# Patient Record
Sex: MALE | Race: WHITE | URBAN - METROPOLITAN AREA
[De-identification: names, ages, dates, MRNs, and addresses within clinical notes are randomized per-mention and may not be internally consistent; named-entity substitution may affect disease eponyms.]

---

## 2018-12-04 ENCOUNTER — TRANSFERRED RECORDS (OUTPATIENT)
Dept: PHYSICAL THERAPY | Facility: CLINIC | Age: 33
End: 2018-12-04

## 2018-12-17 ENCOUNTER — THERAPY VISIT (OUTPATIENT)
Dept: PHYSICAL THERAPY | Facility: CLINIC | Age: 33
End: 2018-12-17
Payer: OTHER MISCELLANEOUS

## 2018-12-17 DIAGNOSIS — M54.50 ACUTE RIGHT-SIDED LOW BACK PAIN WITHOUT SCIATICA: ICD-10-CM

## 2018-12-17 PROCEDURE — 97161 PT EVAL LOW COMPLEX 20 MIN: CPT | Mod: GP | Performed by: PHYSICAL THERAPIST

## 2018-12-17 PROCEDURE — 97530 THERAPEUTIC ACTIVITIES: CPT | Mod: GP | Performed by: PHYSICAL THERAPIST

## 2018-12-17 PROCEDURE — 97110 THERAPEUTIC EXERCISES: CPT | Mod: GP | Performed by: PHYSICAL THERAPIST

## 2018-12-17 NOTE — PROGRESS NOTES
HPI                     System    Physical Exam                                         Musculoskeletal:        Arms:      ROS

## 2018-12-17 NOTE — PROGRESS NOTES
[facility] Initial Evaluation -- Lumbar    Date: December 17, 2018  Osmel Mccloud is a 33 year old male with a lumbar condition.   Referral: primary care  Work mechanical stresses:  Lifting, operating a machine   Employment status:  Working with restrictions for [employer]  Leisure mechanical stresses: golfing  Functional disability score (NEIL/STarT Back):  See NEIL in flowsheet  VAS score (0-10): 4/10  Patient goals/expectations:  derease pain    HISTORY:    Present symptoms: R to mid low back pain, stiffness  Pain quality (sharp/shooting/stabbing/aching/burning/cramping):  achey   Paresthesia (yes/no):  no    Present since (onset date): 11/21/18.     Symptoms (improving/unchanging/worsening):  unchanging.     Symptoms commenced as a result of: turning while on a ladder at work (was carrying a wreath)   Condition occurred in the following environment:   work     Symptoms at onset (back/thigh/leg): whole low back  Constant symptoms (back/thigh/leg): none  Intermittent symptoms (back/thigh/leg): back pain    Symptoms are made worse with the following: Always Sitting, Time of day - Always PM and Sometimes On the move   Symptoms are made better with the following: Other - foam roller, muscle relaxants    Disturbed sleep (yes/no):  Not much with meds Sleeping postures (prone/sup/side R/L): sides    Previous episodes (0/1-5/6-10/11+): hematoma from football contact in highs school Year of first episode: (high school)    Previous history: none  Previous treatments: none      Specific Questions:  Cough/Sneeze/Strain (pos/neg): neg  Bowel/Bladder (normal/abnormal): normal  Gait (normal/abnormal): normal  Medications (nil/NSAIDS/analg/steroids/anticoag/other):  Muscle relaxants  Medical allergies:  augmentin  General health (excellent/good/fair/poor):  good  Pertinent medical history:  None  Imaging (None/Xray/MRI/Other):  none  Recent or major surgery (yes/no):  none  Night pain  (yes/no): no  Accidents (yes/no): no  Unexplained weight loss (yes/no): no  Barriers at home: none  Other red flags: none    EXAMINATION    Posture:   Sitting (good/fair/poor): poor  Standing (good/fair/poor):fair  Lordosis (red/acc/normal): red  Correction of posture (better/worse/no effect): pain central spine    Lateral Shift (right/left/nil): nil  Relevant (yes/no):  n/a  Other Observations: none    Neurological:    Motor deficit:  none  Reflexes:  n/a  Sensory deficit:  none  Dural signs:  n/a    Movement Loss:   Tone Mod Min Nil Pain   Flexion    x PDM   Extension  x   Central low back pain   Side Gliding R    x    Side Gliding L    x      Test Movements:   During: produces, abolishes, increases, decreases, no effect, centralizing, peripheralizing   After: better, worse, no better, no worse, no effect, centralized, peripheralized    Pretest symptoms standing:    Symptoms During Symptoms After ROM increased ROM decreased No Effect   FIS        Rep FIS        EIS        Rep EIS        Pretest symptoms lying:     Symptoms During Symptoms After ROM increased ROM decreased No Effect   TEO        Rep TEO        EIL Produces No Worse      Rep EIL Decreases Better x     If required, pretest symptoms:    Symptoms During Symptoms After ROM increased ROM decreased No Effect   SGIS - R        Rep SGIS - R        SGIS - L        Rep SGIS - L          Static Tests:  Sitting slouched:    Sitting erect:    Standing slouched   Standing erect:    Lying prone in extension:   Long sitting:      Other Tests:     Provisional Classification:  Derangement - Asymmetrical, unilateral, symptoms above knee    Principle of Management:  Education:  Posture, temporary avoidance of flexion   Equipment provided:    Mechanical therapy (Y/N):  Y   Extension principle:  EIL with self overpressure 10 reps every 2-3 hrs  Lateral Principle:    Flexion principle:    Other:      ASSESSMENT/PLAN:    Patient is a 33 year old male with lumbar complaints.     Patient has the following significant findings with corresponding treatment plan.                Diagnosis 1:  R lumbar above knee derangement  Pain -  manual therapy, self management, education, directional preference exercise and home program  Decreased ROM/flexibility - manual therapy, therapeutic exercise, therapeutic activity and home program  Inflammation - self management/home program  Impaired muscle performance - neuro re-education and home program  Decreased function - therapeutic activities and home program  Impaired posture - neuro re-education, therapeutic activities and home program    Therapy Evaluation Codes:   1) History comprised of:   Personal factors that impact the plan of care:      None.    Comorbidity factors that impact the plan of care are:      None.     Medications impacting care: Muscle relaxant.  2) Examination of Body Systems comprised of:   Body structures and functions that impact the plan of care:      Lumbar spine.   Activity limitations that impact the plan of care are:      Lifting and Sitting.  3) Clinical presentation characteristics are:   Stable/Uncomplicated.  4) Decision-Making    Low complexity using standardized patient assessment instrument and/or measureable assessment of functional outcome.  Cumulative Therapy Evaluation is: Low complexity.    Previous and current functional limitations:  (See Goal Flow Sheet for this information)    Short term and Long term goals: (See Goal Flow Sheet for this information)     Communication ability:  Patient appears to be able to clearly communicate and understand verbal and written communication and follow directions correctly.  Treatment Explanation - The following has been discussed with the patient:   RX ordered/plan of care  Anticipated outcomes  Possible risks and side effects  This patient would benefit from PT intervention to resume normal activities.   Rehab potential is excellent.    Frequency:  1 X week, once  daily  Duration:  for 6 weeks  Discharge Plan:  Achieve all LTG.  Independent in home treatment program.  Reach maximal therapeutic benefit.    Please refer to the daily flowsheet for treatment today, total treatment time and time spent performing 1:1 timed codes.

## 2018-12-17 NOTE — LETTER
[facility] ATHLETIC [facility] PHYSICAL [facility]  [id number]  [place] [id number] [phone]    2018    Re: Osmel Mccloud   :   1985  MRN:  [id number]   REFERRING PHYSICIAN:   Paige Rea    [facility]IC [facility]    Date of Initial Evaluation:  2018  Visits:  Rxs Used: 1  Reason for Referral:  Acute right-sided low back pain without sciatica    EVALUATION SUMMARY    [facility] Initial Evaluation -- Lumbar    Date: 2018  Osmel Mccloud is a 33 year old male with a lumbar condition.   Referral: primary care  Work mechanical stresses:  Lifting, operating a machine   Employment status:  Working with restrictions for [employer]  Leisure mechanical stresses: [employer]  Functional disability score (NEIL/STarT Back):  See NEIL in flowsheet  VAS score (0-10): 4/10  Patient goals/expectations:  derease pain  HISTORY:  Present symptoms: R to mid low back pain, stiffness  Pain quality (sharp/shooting/stabbing/aching/burning/cramping):  achey   Paresthesia (yes/no):  no  Present since (onset date): 18.     Symptoms (improving/unchanging/worsening):  unchanging.   Symptoms commenced as a result of: turning while on a ladder at work (was carrying a wreath)   Condition occurred in the following environment:   work   Symptoms at onset (back/thigh/leg): whole low back  Constant symptoms (back/thigh/leg): none  Intermittent symptoms (back/thigh/leg): back pain  Symptoms are made worse with the following: Always Sitting, Time of day - Always PM and Sometimes On the move   Symptoms are made better with the following: Other - foam roller, muscle relaxants  Disturbed sleep (yes/no):  Not much with meds Sleeping postures (prone/sup/side R/L): sides  Previous episodes (0/1-5/6-10/11+): hematoma from football contact in highs school Year of first episode: (high  school)    Previous history: none  Previous treatments: none  Re: Osmel Mccloud   :   1985    Specific Questions:  Cough/Sneeze/Strain (pos/neg): neg  Bowel/Bladder (normal/abnormal): normal  Gait (normal/abnormal): normal  Medications (nil/NSAIDS/analg/steroids/anticoag/other):  Muscle relaxants  Medical allergies:  augmentin  General health (excellent/good/fair/poor):  good  Pertinent medical history:  None  Imaging (None/Xray/MRI/Other):  none  Recent or major surgery (yes/no):  none  Night pain (yes/no): no  Accidents (yes/no): no  Unexplained weight loss (yes/no): no  Barriers at home: none  Other red flags: none    EXAMINATION  Posture:   Sitting (good/fair/poor): poor  Standing (good/fair/poor):fair  Lordosis (red/acc/normal): red  Correction of posture (better/worse/no effect): pain central spine  Lateral Shift (right/left/nil): nil  Relevant (yes/no):  n/a  Other Observations: none  Neurological:  Motor deficit:  none  Reflexes:  n/a  Sensory deficit:  none  Dural signs:  n/a  Movement Loss:   Tone Mod Min Nil Pain   Flexion    x PDM   Extension  x   Central low back pain   Side Gliding R    x    Side Gliding L    x    Test Movements:   During: produces, abolishes, increases, decreases, no effect, centralizing, peripheralizing   After: better, worse, no better, no worse, no effect, centralized, peripheralized  Pretest symptoms standing:    Symptoms During Symptoms After ROM increased ROM decreased No Effect   FIS        Rep FIS        EIS        Rep EIS        Pretest symptoms lying:     Symptoms During Symptoms After ROM increased ROM decreased No Effect   TEO        Rep TEO        EIL Produces No Worse      Rep EIL Decreases Better x     If required, pretest symptoms:    Symptoms During Symptoms After ROM increased ROM decreased No Effect   SGIS - R        Rep SGIS - R        SGIS - L        Rep SGIS - L        Static Tests:  Sitting slouched:    Sitting erect:    Standing slouched   Standing  erect:    Lying prone in extension:   Long sitting:    Other Tests:   Provisional Classification:  Derangement - Asymmetrical, unilateral, symptoms above knee  Principle of Management:  Education:  Posture, temporary avoidance of flexion   Equipment provided:    Mechanical therapy (Y/N):  Y   Extension principle:  EIL with self overpressure 10 reps every 2-3 hrs  Lateral Principle:    Flexion principle:    Other:    ASSESSMENT/PLAN:  Patient is a 33 year old male with lumbar complaints.    Patient has the following significant findings with corresponding treatment plan.                Diagnosis 1:  R lumbar above knee derangement  Pain -  manual therapy, self management, education, directional preference exercise and home program  Decreased ROM/flexibility - manual therapy, therapeutic exercise, therapeutic activity and home program  Inflammation - self management/home program  Impaired muscle performance - neuro re-education and home program  Decreased function - therapeutic activities and home program  Impaired posture - neuro re-education, therapeutic activities and home program  Therapy Evaluation Codes:   1) History comprised of:   Personal factors that impact the plan of care:      None.    Comorbidity factors that impact the plan of care are:      None.     Medications impacting care: Muscle relaxant.  2) Examination of Body Systems comprised of:   Body structures and functions that impact the plan of care:      Lumbar spine.   Activity limitations that impact the plan of care are:      Lifting and Sitting.  3) Clinical presentation characteristics are:   Stable/Uncomplicated.  4) Decision-Making    Low complexity using standardized patient assessment instrument and/or measureable assessment of functional outcome.  Cumulative Therapy Evaluation is: Low complexity.    Re: Osmel Mccloud   :   1985    Previous and current functional limitations:  (See Goal Flow Sheet for this information)    Short term  and Long term goals: (See Goal Flow Sheet for this information)   Communication ability:  Patient appears to be able to clearly communicate and understand verbal and written communication and follow directions correctly.  Treatment Explanation - The following has been discussed with the patient:   RX ordered/plan of care  Anticipated outcomes  Possible risks and side effects  This patient would benefit from PT intervention to resume normal activities.   Rehab potential is excellent.  Frequency:  1 X week, once daily  Duration:  for 6 weeks  Discharge Plan:  Achieve all LTG.  Independent in home treatment program.  Reach maximal therapeutic benefit.                                 Musculoskeletal:        Arms:          Thank you for your referral.    INQUIRIES  Therapist: PAUL Fowler, Cert, MDT  [facility] PHYSICAL THERAPY  [address] [place] [address]. [address]  [facility] [address]  Phone: [phone]  Fax: [phone]

## 2018-12-24 ENCOUNTER — THERAPY VISIT (OUTPATIENT)
Dept: PHYSICAL THERAPY | Facility: CLINIC | Age: 33
End: 2018-12-24
Payer: OTHER MISCELLANEOUS

## 2018-12-24 DIAGNOSIS — M54.50 ACUTE RIGHT-SIDED LOW BACK PAIN WITHOUT SCIATICA: ICD-10-CM

## 2018-12-24 PROCEDURE — 97530 THERAPEUTIC ACTIVITIES: CPT | Mod: GP | Performed by: PHYSICAL THERAPIST

## 2018-12-24 PROCEDURE — 97110 THERAPEUTIC EXERCISES: CPT | Mod: GP | Performed by: PHYSICAL THERAPIST

## 2018-12-24 NOTE — PROGRESS NOTES
Subjective:  HPI                    Objective:  System    Physical Exam    General     ROS    Assessment/Plan:    SUBJECTIVE  Subjective changes as noted by pt:  Performing pressups about 5-6x/day, mostly concentrated in the evening as its difficult to complete at work. As a result, the pain is better. Still stiff in the morning. During the day feels pretty good. Overall about 50-60% improved.      Current pain level: 4/10     Changes in function:  Yes (See Goal flowsheet attached for changes in current functional level)     Adverse reaction to treatment or activity:  None    OBJECTIVE  Changes in objective findings:  Yes, See physical exam section and/or daily flowsheet for response to repeated movements.           ASSESSMENT  Osmel continues to require intervention to meet STG and LTG's: PT  Patient's symptoms are resolving.  Patient is progressing as expected.  Response to therapy has shown an improvement in  pain level, ROM  and function  Progress made towards STG/LTG?  Yes (See Goal flowsheet attached for updates on achievement of STG and LTG)    PLAN  Continue current treatment plan until patient demonstrates readiness to progress to higher level exercises.    PTA/ATC plan:  N/A    Please refer to the daily flowsheet for treatment today, total treatment time and time spent performing 1:1 timed codes.

## 2019-01-03 ENCOUNTER — THERAPY VISIT (OUTPATIENT)
Dept: PHYSICAL THERAPY | Facility: CLINIC | Age: 34
End: 2019-01-03
Payer: OTHER MISCELLANEOUS

## 2019-01-03 DIAGNOSIS — M54.50 ACUTE RIGHT-SIDED LOW BACK PAIN WITHOUT SCIATICA: ICD-10-CM

## 2019-01-03 PROCEDURE — 97110 THERAPEUTIC EXERCISES: CPT | Mod: GP | Performed by: PHYSICAL THERAPIST

## 2019-01-03 PROCEDURE — 97530 THERAPEUTIC ACTIVITIES: CPT | Mod: GP | Performed by: PHYSICAL THERAPIST

## 2019-01-03 PROCEDURE — 97140 MANUAL THERAPY 1/> REGIONS: CPT | Mod: GP | Performed by: PHYSICAL THERAPIST

## 2019-01-03 NOTE — PROGRESS NOTES
[facility] Initial Evaluation  Subjective:  HPI                    Objective:  System    Physical Exam    General     ROS    Assessment/Plan:    SUBJECTIVE  Subjective changes as noted by pt:  Has been pretty sore. It's been a rough week. Not sleeping well. It started around [place]. Pain is across low back and up spine.  Slipped on ice at work on 12/28 and fell on back but doesn't think that had any effect.      Current pain level: 6/10     Changes in function:  Yes (See Goal flowsheet attached for changes in current functional level)     Adverse reaction to treatment or activity:  None    OBJECTIVE  Changes in objective findings:  Yes, See physical exam section and/or daily flowsheet for response to repeated movements.           ASSESSMENT  Osmel continues to require intervention to meet STG and LTG's: PT  Patient has experienced an exacerbation of symptoms.  Response to therapy has shown a worsening of  pain level  Progress made towards STG/LTG?  Yes (See Goal flowsheet attached for updates on achievement of STG and LTG)    PLAN  Current treatment program is being advanced to more complex exercises.    PTA/ATC plan:  N/A    Please refer to the daily flowsheet for treatment today, total treatment time and time spent performing 1:1 timed codes.

## 2019-01-11 ENCOUNTER — THERAPY VISIT (OUTPATIENT)
Dept: PHYSICAL THERAPY | Facility: CLINIC | Age: 34
End: 2019-01-11
Payer: OTHER MISCELLANEOUS

## 2019-01-11 DIAGNOSIS — M54.50 ACUTE RIGHT-SIDED LOW BACK PAIN WITHOUT SCIATICA: ICD-10-CM

## 2019-01-11 PROCEDURE — 97530 THERAPEUTIC ACTIVITIES: CPT | Mod: GP | Performed by: PHYSICAL THERAPIST

## 2019-01-11 PROCEDURE — 97140 MANUAL THERAPY 1/> REGIONS: CPT | Mod: GP | Performed by: PHYSICAL THERAPIST

## 2019-01-11 PROCEDURE — 97110 THERAPEUTIC EXERCISES: CPT | Mod: GP | Performed by: PHYSICAL THERAPIST

## 2019-01-11 NOTE — PROGRESS NOTES
Subjective:  HPI                    Objective:  System    Physical Exam    General     ROS    Assessment/Plan:    SUBJECTIVE  Subjective changes as noted by pt:  Putting hands on towels for pressups about 5-6x/day. Less pain. Notices it the most morning and night. Sitting a lot at work so trying to get up often. 50% to where he wants to be.       Current pain level: 4/10     Changes in function:  None     Adverse reaction to treatment or activity:  None    OBJECTIVE  Changes in objective findings:  Yes, See physical exam section and/or daily flowsheet for response to repeated movements.           ASSESSMENT  Osmel continues to require intervention to meet STG and LTG's: PT  Patient is progressing as expected.  Response to therapy has shown an improvement in  pain level and function  Progress made towards STG/LTG?  None    PLAN  Continue current treatment plan until patient demonstrates readiness to progress to higher level exercises.    PTA/ATC plan:  N/A    Please refer to the daily flowsheet for treatment today, total treatment time and time spent performing 1:1 timed codes.

## 2019-01-22 ENCOUNTER — THERAPY VISIT (OUTPATIENT)
Dept: PHYSICAL THERAPY | Facility: CLINIC | Age: 34
End: 2019-01-22
Payer: OTHER MISCELLANEOUS

## 2019-01-22 DIAGNOSIS — M54.50 ACUTE RIGHT-SIDED LOW BACK PAIN WITHOUT SCIATICA: ICD-10-CM

## 2019-01-22 PROCEDURE — 97110 THERAPEUTIC EXERCISES: CPT | Mod: GP | Performed by: PHYSICAL THERAPIST

## 2019-01-22 PROCEDURE — 97140 MANUAL THERAPY 1/> REGIONS: CPT | Mod: GP | Performed by: PHYSICAL THERAPIST

## 2019-01-22 PROCEDURE — 97530 THERAPEUTIC ACTIVITIES: CPT | Mod: GP | Performed by: PHYSICAL THERAPIST

## 2019-01-22 NOTE — PROGRESS NOTES
Subjective:  HPI                    Objective:  System    Physical Exam    General     ROS    Assessment/Plan:    SUBJECTIVE  Subjective changes as noted by pt:  Doing better, not a lot of pain. If sits too long still can get a little sore. 90% to where he wants to be.     Current pain level: 0/10     Changes in function:  Yes (See Goal flowsheet attached for changes in current functional level)     Adverse reaction to treatment or activity:  None    OBJECTIVE  Changes in objective findings:  Yes, See physical exam section and/or daily flowsheet for response to repeated movements.           ASSESSMENT  Osmel continues to require intervention to meet STG and LTG's: PT  Patient is becoming more independent in home exercise program  Response to therapy has shown an improvement in  pain level and function  Progress made towards STG/LTG?  Yes (See Goal flowsheet attached for updates on achievement of STG and LTG)    PLAN  Continue current treatment plan until patient demonstrates readiness to progress to higher level exercises.    PTA/ATC plan:  N/A    Please refer to the daily flowsheet for treatment today, total treatment time and time spent performing 1:1 timed codes.

## 2019-01-23 NOTE — TELEPHONE ENCOUNTER
RECORDS STATUS - BREAST    RECORDS RECEIVED FROM: Epic   DATE RECEIVED:    NOTES STATUS DETAILS   OFFICE NOTE from referring provider     OFFICE NOTE from medical oncologist     OFFICE NOTE from surgeon     OFFICE NOTE from radiation oncologist     DISCHARGE SUMMARY from hospital     DISCHARGE REPORT from the ER     OPERATIVE REPORT     MEDICATION LIST     CLINICAL TRIAL TREATMENTS TO DATE     LABS     PATHOLOGY REPORTS  (Tissue diagnosis, Stage, ER/OH percentage positive and intensity of staining, HER2 IHC, FISH, and all biopsies from breast and any distant metastasis)                     GENONOMIC TESTING     TYPE:   (Next Generation Sequencing, including Foundation One testing, and Oncotype score)     IMAGING (NEED IMAGES & REPORT)     CT SCANS     MRI     MAMMO     ULTRASOUND     PET     BONE SCAN     BRAIN MRI

## 2019-01-25 ENCOUNTER — PRE VISIT (OUTPATIENT)
Dept: ONCOLOGY | Facility: CLINIC | Age: 34
End: 2019-01-25

## 2019-01-25 ENCOUNTER — OFFICE VISIT (OUTPATIENT)
Dept: ONCOLOGY | Facility: CLINIC | Age: 34
End: 2019-01-25
Attending: GENETIC COUNSELOR, MS
Payer: COMMERCIAL

## 2019-01-25 DIAGNOSIS — Z84.81 FAMILY HISTORY OF BRCA1 GENE POSITIVE: ICD-10-CM

## 2019-01-25 DIAGNOSIS — Z80.3 FAMILY HISTORY OF MALIGNANT NEOPLASM OF BREAST: ICD-10-CM

## 2019-01-25 DIAGNOSIS — Z80.0 FAMILY HISTORY OF PANCREATIC CANCER: ICD-10-CM

## 2019-01-25 DIAGNOSIS — Z80.41 FAMILY HISTORY OF MALIGNANT NEOPLASM OF OVARY: ICD-10-CM

## 2019-01-25 DIAGNOSIS — Z84.81 FAMILY HISTORY OF BRCA1 GENE POSITIVE: Primary | ICD-10-CM

## 2019-01-25 LAB — MISCELLANEOUS TEST: NORMAL

## 2019-01-25 PROCEDURE — 96040 ZZH GENETIC COUNSELING, EACH 30 MINUTES: CPT | Mod: ZF | Performed by: GENETIC COUNSELOR, MS

## 2019-01-25 PROCEDURE — 36415 COLL VENOUS BLD VENIPUNCTURE: CPT

## 2019-01-25 NOTE — LETTER
Date:January 30, 2019      Patient was self referred, no letter generated. Do not send.        [facility] Health Information

## 2019-01-25 NOTE — TELEPHONE ENCOUNTER
Sending Miladis Andrade a staff message letting her know we do not have any information on the pt. Pt is self referred. We attempted 3 times to get a hold of the pt with no call back.

## 2019-01-25 NOTE — PATIENT INSTRUCTIONS
Assessing Cancer Risk  Only about 5-10% of cancers are thought to be due to an inherited cancer susceptibility gene.    These families often have:    Several people with the same or related types of cancer    Cancers diagnosed at a young age (before age 50)    Individuals with more than one primary cancer    Multiple generations of the family affected with cancer    BRCA1 and BRCA2 Genes  We each inherit two copies of every gene in our bodies: one from our mother, and one from our father.  Each gene has a specific job to do.  When a gene has a mistake or  mutation  in it, it does not work like it should.  Everyone has two copies of BRCA1 and two copies of BRCA2.  A single mutation in one of the copies of BRCA1 or BRCA2 increases the risk for breast and ovarian cancer, among others.  The risk for pancreatic cancer and melanoma may also be slightly increased in some families.  The tables below list the chance that someone with a BRCA mutation would develop cancer in his or her lifetime1,2,3,4.      Women   Men    General Population BRCA +   General Population BRCA +   Breast 12% 40-85%  Breast <1% ~7%   Ovarian 1-2% 10-40%  Prostate 16% 20%     Inheriting a mutation does not mean a person will develop cancer, but it does significantly increase his or her risk above the general population risk.    A person s ethnic background is also important to consider, as individuals of Ashkenazi Christian ancestry have a higher chance of having a BRCA gene mutation.  There are three BRCA mutations that occur more frequently in this population.     Genetic Testing  Genetic testing involves a simple blood test and will look at the genetic information in the BRCA1 and BRCA2 genes for any harmful mutations that are associated with increased cancer risk.  If possible, it is recommended that the person(s) who has had cancer be tested before other family members.  That person will give us the most useful information about whether or not  a specific gene is associated with the cancer in the family.     Results  There are three possible results of BRCA1 and BRCA2 genetic testing:    Positive--a harmful mutation was identified    Negative--no mutation was identified    Variant of unknown significance--a variation in one of the genes was identified, but it is unclear how this impacts cancer risk in the family  Advantages and Disadvantages  There are advantages and disadvantages to genetic testing of these genes.    Advantages    May clarify your cancer risk    Can help you make medical decisions    May explain the cancers in your family    May give useful information to your family members (if you share your results)    Disadvantages    Possible negative emotional impact of learning about inherited cancer risk    Uncertainty in interpreting a negative test result in some situations    Possible genetic discrimination concerns (see below)    Inheritance   BRCA1 and BRCA2 mutations are inherited in an autosomal dominant pattern.  This means that if a parent has a mutation, each of his or her children will have a 50% chance of inheriting that same mutation.  Therefore, each child--male or female--would have a 50% chance of being at increased risk for developing cancer.                                              Image obtained from Genetics Home Reference, 2013     Genetic Information Nondiscrimination Act (SETH)  SETH is a federal law that protects individuals from health insurance or employment discrimination based on a genetic test result alone.  Although rare, there are currently no legal protections in terms of life insurance, long term care, or disability insurances.  Visit the National Human Genome Research [place] at Genome.gov/15163325 to learn more.    Reducing Cancer Risk  Current screening recommendations for women with a BRCA mutation include1:    Breast:  o Breast awareness starting at age 18  o Clinical breast exams starting at age 25;  repeated every 6-12 months  o Annual breast MRI starting at age 25 (or possibly younger)  o Annual mammogram (consider tomosynthesis) and breast MRI at age 30 (for women with and without a breast cancer history)  o Consideration of preventative mastectomy    Ovarian:   o Consideration of transvaginal ultrasound and CA-125 blood test starting at age 30 (or possibly younger); repeated every 6 months  o Recommendation for surgery to remove the ovaries and fallopian tubes after child bearing or by age 35-40. Women with BRCA2 mutations may delay this surgery until ages 40-45, unless it is warranted to consider sooner based on family history.    Some data suggests that women with BRCA1 mutations may be at slightly higher risk for serous uterine cancer. Information is limited at this time, and further research is needed to better understand this risk. Women with BRCA1 mutations should discuss the risks and benefits of hysterectomy at the time of ovary removal.     Current screening recommendations for men with a BRCA mutation include1:    Breast:  o Self-breast exams starting at age 35  o Annual clinical breast exams starting at age 35    Prostate:   o Recommend prostate cancer screening starting at age 45 for BRCA2 carriers  o Consider prostate cancer screening starting at age 45 for BRCA1 carriers    Both men and women with BRCA mutations should talk to their doctor or genetic counselor about screening for pancreatic cancer and melanoma.  In addition, the age at which to start screening may be modified based on family history of young cancer.    Questions to Think About Regarding Genetic Testing    What effect will the test result have on me and my relationship with my family members if I have an inherited gene mutation?  If I don t have a gene mutation?    Should I share my test results, and how will my family react to this news, which may also affect them?    Are my children ready to learn new information that may one  day affect their own health?    Resources  FORCE: Facing Our Risk of Cancer Empowered facingourrisk.org   Bright Pink bebrightpink.org   American Cancer Society (ACS) cancer.org   National Cancer [place] (NCI) cancer.gov     Please call us if you have any questions or concerns.   Cancer Risk Management Program [phone]-CANCER ([phone])  ? Lita Harrison, MS, [facility]  [phone]  ? Brooklyn Bailey, MS, [facility]  [phone]  ? Miladis Andrade, MS, [facility]  [phone]  ? Eli Sanchez, MS, [facility]  [phone]  ? Stacey Garrido, MS, [facility] [phone]    References:  1. National Comprehensive Cancer Network. Clinical practice guidelines in oncology, colorectal cancer screening. Available online (registration required). 2019.

## 2019-01-25 NOTE — LETTER
Cancer Risk Management  Program Locations    [facility]  [facility]  Mailing Address  Cancer Risk Management Program  [facility]  [address]  [place] [address]    New patient appointments  [phone]  January 29, 2019    Osmel [place]layton  [address] [address]      Dear Osmel,    It was a pleasure meeting with you at the [facility] on 1/25/2019.  Here is a copy of the progress note from your recent genetic counseling visit to the Cancer Risk Management Program.  If you have any additional questions, please feel free to call.    Referring Provider: self referred    Presenting Information:   I met with Osmel Sigalaclayton today for genetic counseling at the Cancer Risk Management Program at the [facility] to discuss his family history of breast, ovarian and pancreatic cancer including a paternal BRCA1 gene mutation.  He is here today to review this history and available genetic testing options.    Personal History:  Osmel is a 33 year old male.  He does not have any personal history of cancer.  He reports seeing his primary care provider as needed, and does not regularly do any other cancer screening at this time.  He reports completing a normal colonoscopy at age 31 due to GI/stomach problems.  Osmel reported no current tobacco use, and no concerns regarding alcohol use or environmental exposures.    Family History: (Please see scanned pedigree for detailed family history information)    His sister is 34 and does not have a history of cancer.  She completed specific site BRCA1 gene analysis through [employer] in 2017 which was positive for the familial p.* mutation.  A copy of her test report was available today for review.      His maternal grandfather was diagnosed with  pancreatic cancer at age 77 and passed away at 78    His paternal aunt was diagnosed with breast cancer at age 33 and carries a BRCA1 gene mutation.  Her daughter was diagnosed with breast cancer at 36 and also carries the BRCA1 gene mutation    Osmel's paternal great grandmother (through his grandfather) had a history of breast and ovarian cancer.      His maternal ethnicity is Japanese. His paternal ethnicity is [place].  There is no known Ashkenazi Taoism ancestry on either side of his family.    Discussion:    Osmel has a paternal family history of breast cancer with an identified BRCA1 gene mutation.  Specifically, the mutation identified in his sister and paternal relatives is called p.*.  We discussed that this gene is consistent with a diagnosis of Hereditary Breast and Ovarian Cancer syndrome.  We discussed the impact of this testing on Osmel and his family in detail.      We discussed the natural history and genetics of Hereditary Breast and Ovarian Cancer syndrome caused by mutations in the BRCA1 gene. A detailed handout regarding this cancer syndrome and the information we discussed was provided to Osmel at the end of our appointment today and can be found in the after visit summary.  Topics included: inheritance pattern, cancer risks, cancer screening recommendations, and also risks, benefits and limitations of testing.    We reviewed that mutations in the BRCA1 gene are inherited in an autosomal dominant pattern.  This means that Osmel has a 50% chance of inheriting the same mutation which was identified in his sister.  Mutations in this gene to not skip generations.  We reviewed that his paternal great grandmother's history of breast and ovarian cancer is suggestive of the familial BRCA1 gene mutation, however it has not been determined (per records today) that this mutation was inherited from his paternal grandfather and great-grandmother.      Osmel has a maternal family history of  pancreatic cancer.  Recent National Comprehensive Cancer Network (NCCN) updates now support genetic testing for individual with a close family history of pancreatic cancer.  Based on his maternal grandfather's history of pancreatic cancer, Osmel meets current NCCN guidelines for genetic testing of BRCA1/2.      We discussed that there are additional genes that could cause increased risk for pancreatic and ovarian cancer.  As many of these genes present with overlapping features in a family and accurate cancer risk cannot always be established based upon the pedigree analysis alone, it would be reasonable for Osmel to consider panel genetic testing to analyze multiple genes at once.    We discussed available genetic testing options, including specific site analysis for the familial BRCA1 gene mutation and expanded panel testing for additional genes associated with hereditary pancreatic and ovarian cancers.  Osmel expressed interest in learning as much information as possible from testing, and elected to proceed with the CustomNext-Cancer gene panel (a combination of OvaNext and PancNext) through [employer].    Genetic testing is available for 27 genes associated with hereditary pancreatic, breast and gynecologic cancers: OvaNext (MATTHEW, BARD1, BRCA1, BRCA2, BRIP1, CDH1, CHEK2, DICER1, EPCAM, MLH1, MRE11A, MSH2, MSH6, MUTYH, NBN, NF1, PALB2, PMS2, PTEN, RAD50, RAD51C, RAD51D, SMARCA4, STK11, and TP53).  We discussed that some of the genes in this custom panel are associated with specific hereditary cancer syndromes and have published management guidelines: Hereditary Breast and Ovarian Cancer syndrome (BRCA1, BRCA2), Milner syndrome (MLH1, MSH2, MSH6, PMS2, EPCAM), Hereditary Diffuse Gastric Cancer (CDH1), Cowden syndrome (PTEN), Li Fraumeni syndrome (TP53), Peutz-Jeghers syndrome (STK11), Familial Atypical Multiple Mole Melanoma syndrome (CDKN2A), MUTYH Associated Polyposis syndrome (MUTYH), Familial Adenomatous  Polyposis (APC), and Neurofibromatosis type 1 (NF1).    Risk-reducing salpingo-oophorectomy can be considered in women with mutations in BRIP1, RAD51C, or RAD51D.  Breast cancer risk management guidelines are available for MATTHEW, CHEK2, PALB2, NF1, and NBN.  The remaining genes (BARD1, DICER1, MRE11A, RAD50, and SMARCA4) are associated with increased cancer risk and may allow us to make medical recommendations when mutations are identified.    Osmel was provided with a detailed brochure from [employer] explaining the OvaNext testing.  Consent was obtained and genetic testing for CustomNext-Cancer was sent to [facility]. Turn around time: approximately 3-4 weeks.    Medical Management: For Osmel, we reviewed that the information from genetic testing may determine additional cancer screening recommendations (such as current NCCN screening recommendations for men with a BRCA1 gene mutation, more frequent colonoscopies, more frequent dermatologic exams, etc.), and targeted chemotherapies for certain cancers if needed in the future (i.e. immunotherapy for individuals with Milner syndrome, PARP inhibitors, etc.). These recommendations will be discussed in detail once genetic testing is completed.     Plan:  1) Today Osmel elected to proceed with the CustomNext-Cancer gene panel (a combination of OvaNext and PancNext) offered through [employer].  2) This information should be available in 3-4 weeks.  3) Osmel will be contacted once results are available.    Miladis Andrade MS, [facility]  Licensed Genetic Counselor  [phone]

## 2019-01-25 NOTE — NURSING NOTE
Chief Complaint   Patient presents with     Lab Only     Pt here for venipunctrure blood draw only.      Ting Cheek MA

## 2019-01-25 NOTE — TELEPHONE ENCOUNTER
Spoke with Osmel, He is aware of 10:15 apt and per pt His sister gave all pertinent family history to the provider.

## 2019-01-25 NOTE — LETTER
1/25/2019       RE: Osmel Mccloud  [address] [place] [address]     Dear Colleague,    Thank you for referring your patient, Osmel Mccloud, to the [facility]. Please see a copy of my visit note below.    1/25/2019    Referring Provider: self referred    Presenting Information:   I met with Osmel Mccloud today for genetic counseling at the Cancer Risk Management Program at the [facility] to discuss his family history of breast, ovarian and pancreatic cancer including a paternal BRCA1 gene mutation.  He is here today to review this history and available genetic testing options.    Personal History:  Osmel is a 33 year old male.  He does not have any personal history of cancer.  He reports seeing his primary care provider as needed, and does not regularly do any other cancer screening at this time.  He reports completing a normal colonoscopy at age 31 due to GI/stomach problems.  Osmel reported no current tobacco use, and no concerns regarding alcohol use or environmental exposures.    Family History: (Please see scanned pedigree for detailed family history information)    His sister is 34 and does not have a history of cancer.  She completed specific site BRCA1 gene analysis through [employer] in 2017 which was positive for the familial p.* mutation.  A copy of her test report was available today for review.      His maternal grandfather was diagnosed with pancreatic cancer at age 77 and passed away at 78    His paternal aunt was diagnosed with breast cancer at age 33 and carries a BRCA1 gene mutation.  Her daughter was diagnosed with breast cancer at 36 and also carries the BRCA1 gene mutation    Osmel's paternal great grandmother (through his grandfather) had a history of breast and ovarian cancer.      His maternal ethnicity is [place]. His paternal ethnicity is [place].  There is no known Ashkenazi Mandaeism ancestry on either side of his  family.    Discussion:    Osmel has a paternal family history of breast cancer with an identified BRCA1 gene mutation.  Specifically, the mutation identified in his sister and paternal relatives is called p.*.  We discussed that this gene is consistent with a diagnosis of Hereditary Breast and Ovarian Cancer syndrome.  We discussed the impact of this testing on Osmel and his family in detail.      We discussed the natural history and genetics of Hereditary Breast and Ovarian Cancer syndrome caused by mutations in the BRCA1 gene. A detailed handout regarding this cancer syndrome and the information we discussed was provided to Osmel at the end of our appointment today and can be found in the after visit summary.  Topics included: inheritance pattern, cancer risks, cancer screening recommendations, and also risks, benefits and limitations of testing.    We reviewed that mutations in the BRCA1 gene are inherited in an autosomal dominant pattern.  This means that Osmel has a 50% chance of inheriting the same mutation which was identified in his sister.  Mutations in this gene to not skip generations.  We reviewed that his paternal great grandmother's history of breast and ovarian cancer is suggestive of the familial BRCA1 gene mutation, however it has not been determined (per records today) that this mutation was inherited from his paternal grandfather and great-grandmother.      Osmel has a maternal family history of pancreatic cancer.  Recent National Comprehensive Cancer Network (NCCN) updates now support genetic testing for individual with a close family history of pancreatic cancer.  Based on his maternal grandfather's history of pancreatic cancer, Osmel meets current NCCN guidelines for genetic testing of BRCA1/2.      We discussed that there are additional genes that could cause increased risk for pancreatic and ovarian cancer.  As many of these genes present with overlapping features in a family and  accurate cancer risk cannot always be established based upon the pedigree analysis alone, it would be reasonable for Osmel to consider panel genetic testing to analyze multiple genes at once.    We discussed available genetic testing options, including specific site analysis for the familial BRCA1 gene mutation and expanded panel testing for additional genes associated with hereditary pancreatic and ovarian cancers.  Osmel expressed interest in learning as much information as possible from testing, and elected to proceed with the CustomNext-Cancer gene panel (a combination of OvaNext and PancNext) through [employer].    Genetic testing is available for 27 genes associated with hereditary pancreatic, breast and gynecologic cancers: OvaNext (MATTHEW, BARD1, BRCA1, BRCA2, BRIP1, CDH1, CHEK2, DICER1, EPCAM, MLH1, MRE11A, MSH2, MSH6, MUTYH, NBN, NF1, PALB2, PMS2, PTEN, RAD50, RAD51C, RAD51D, SMARCA4, STK11, and TP53).  We discussed that some of the genes in this custom panel are associated with specific hereditary cancer syndromes and have published management guidelines: Hereditary Breast and Ovarian Cancer syndrome (BRCA1, BRCA2), Milner syndrome (MLH1, MSH2, MSH6, PMS2, EPCAM), Hereditary Diffuse Gastric Cancer (CDH1), Cowden syndrome (PTEN), Li Fraumeni syndrome (TP53), Peutz-Jeghers syndrome (STK11), Familial Atypical Multiple Mole Melanoma syndrome (CDKN2A), MUTYH Associated Polyposis syndrome (MUTYH), Familial Adenomatous Polyposis (APC), and Neurofibromatosis type 1 (NF1).    Risk-reducing salpingo-oophorectomy can be considered in women with mutations in BRIP1, RAD51C, or RAD51D.  Breast cancer risk management guidelines are available for MATTHEW, CHEK2, PALB2, NF1, and NBN.  The remaining genes (BARD1, DICER1, MRE11A, RAD50, and SMARCA4) are associated with increased cancer risk and may allow us to make medical recommendations when mutations are identified.    Osmel was provided with a detailed brochure from  [employer] explaining the OvaNext testing.  Consent was obtained and genetic testing for CustomNext-Cancer was sent to [facility]. Turn around time: approximately 3-4 weeks.    Medical Management: For Osmel, we reviewed that the information from genetic testing may determine additional cancer screening recommendations (such as current NCCN screening recommendations for men with a BRCA1 gene mutation, more frequent colonoscopies, more frequent dermatologic exams, etc.), and targeted chemotherapies for certain cancers if needed in the future (i.e. immunotherapy for individuals with Milner syndrome, PARP inhibitors, etc.). These recommendations will be discussed in detail once genetic testing is completed.     Plan:  1) Today Osmel elected to proceed with the CustomNext-Cancer gene panel (a combination of OvaNext and PancNext) offered through [employer].  2) This information should be available in 3-4 weeks.  3) Osmel will be contacted once results are available.    Face to face time: 40 minutes    Miladis Andrade MS, [facility]  Licensed Genetic Counselor  [phone]                 Again, thank you for allowing me to participate in the care of your patient.      Sincerely,    Miladis Andrade, YA

## 2019-01-25 NOTE — PROGRESS NOTES
1/25/2019    Referring Provider: self referred    Presenting Information:   I met with Osmel Mccloud today for genetic counseling at the Cancer Risk Management Program at the [facility] to discuss his family history of breast, ovarian and pancreatic cancer including a paternal BRCA1 gene mutation.  He is here today to review this history and available genetic testing options.    Personal History:  Osmel is a 33 year old male.  He does not have any personal history of cancer.  He reports seeing his primary care provider as needed, and does not regularly do any other cancer screening at this time.  He reports completing a normal colonoscopy at age 31 due to GI/stomach problems.  Osmel reported no current tobacco use, and no concerns regarding alcohol use or environmental exposures.    Family History: (Please see scanned pedigree for detailed family history information)    His sister is 34 and does not have a history of cancer.  She completed specific site BRCA1 gene analysis through [facility] in 2017 which was positive for the familial p.* mutation.  A copy of her test report was available today for review.      His maternal grandfather was diagnosed with pancreatic cancer at age 77 and passed away at 78    His paternal aunt was diagnosed with breast cancer at age 33 and carries a BRCA1 gene mutation.  Her daughter was diagnosed with breast cancer at 36 and also carries the BRCA1 gene mutation    Osmel's paternal great grandmother (through his grandfather) had a history of breast and ovarian cancer.      His maternal ethnicity is [place]. His paternal ethnicity is [place] citizen.  There is no known Ashkenazi Buddhist ancestry on either side of his family.    Discussion:    Osmel has a paternal family history of breast cancer with an identified BRCA1 gene mutation.  Specifically, the mutation identified in his sister and paternal relatives is called p.*.  We discussed that this gene is consistent  with a diagnosis of Hereditary Breast and Ovarian Cancer syndrome.  We discussed the impact of this testing on Osmel and his family in detail.      We discussed the natural history and genetics of Hereditary Breast and Ovarian Cancer syndrome caused by mutations in the BRCA1 gene. A detailed handout regarding this cancer syndrome and the information we discussed was provided to Osmel at the end of our appointment today and can be found in the after visit summary.  Topics included: inheritance pattern, cancer risks, cancer screening recommendations, and also risks, benefits and limitations of testing.    We reviewed that mutations in the BRCA1 gene are inherited in an autosomal dominant pattern.  This means that Osmel has a 50% chance of inheriting the same mutation which was identified in his sister.  Mutations in this gene to not skip generations.  We reviewed that his paternal great grandmother's history of breast and ovarian cancer is suggestive of the familial BRCA1 gene mutation, however it has not been determined (per records today) that this mutation was inherited from his paternal grandfather and great-grandmother.      Osmel has a maternal family history of pancreatic cancer.  Recent National Comprehensive Cancer Network (NCCN) updates now support genetic testing for individual with a close family history of pancreatic cancer.  Based on his maternal grandfather's history of pancreatic cancer, Osmel meets current NCCN guidelines for genetic testing of BRCA1/2.      We discussed that there are additional genes that could cause increased risk for pancreatic and ovarian cancer.  As many of these genes present with overlapping features in a family and accurate cancer risk cannot always be established based upon the pedigree analysis alone, it would be reasonable for Osmel to consider panel genetic testing to analyze multiple genes at once.    We discussed available genetic testing options, including  specific site analysis for the familial BRCA1 gene mutation and expanded panel testing for additional genes associated with hereditary pancreatic and ovarian cancers.  Osmel expressed interest in learning as much information as possible from testing, and elected to proceed with the CustomNext-Cancer gene panel (a combination of OvaNext and PancNext) through [facility].    Genetic testing is available for 27 genes associated with hereditary pancreatic, breast and gynecologic cancers: OvaNext (MATTHEW, BARD1, BRCA1, BRCA2, BRIP1, CDH1, CHEK2, DICER1, EPCAM, MLH1, MRE11A, MSH2, MSH6, MUTYH, NBN, NF1, PALB2, PMS2, PTEN, RAD50, RAD51C, RAD51D, SMARCA4, STK11, and TP53).  We discussed that some of the genes in this custom panel are associated with specific hereditary cancer syndromes and have published management guidelines: Hereditary Breast and Ovarian Cancer syndrome (BRCA1, BRCA2), Milner syndrome (MLH1, MSH2, MSH6, PMS2, EPCAM), Hereditary Diffuse Gastric Cancer (CDH1), Cowden syndrome (PTEN), Li Fraumeni syndrome (TP53), Peutz-Jeghers syndrome (STK11), Familial Atypical Multiple Mole Melanoma syndrome (CDKN2A), MUTYH Associated Polyposis syndrome (MUTYH), Familial Adenomatous Polyposis (APC), and Neurofibromatosis type 1 (NF1).    Risk-reducing salpingo-oophorectomy can be considered in women with mutations in BRIP1, RAD51C, or RAD51D.  Breast cancer risk management guidelines are available for MATTHEW, CHEK2, PALB2, NF1, and NBN.  The remaining genes (BARD1, DICER1, MRE11A, RAD50, and SMARCA4) are associated with increased cancer risk and may allow us to make medical recommendations when mutations are identified.    Osmel was provided with a detailed brochure from [facility] explaining the OvaNext testing.  Consent was obtained and genetic testing for CustomNext-Cancer was sent to [facility]. Turn around time: approximately 3-4 weeks.    Medical Management: For Osmel, we reviewed that the  information from genetic testing may determine additional cancer screening recommendations (such as current NCCN screening recommendations for men with a BRCA1 gene mutation, more frequent colonoscopies, more frequent dermatologic exams, etc.), and targeted chemotherapies for certain cancers if needed in the future (i.e. immunotherapy for individuals with Milner syndrome, PARP inhibitors, etc.). These recommendations will be discussed in detail once genetic testing is completed.     Plan:  1) Today Osmel elected to proceed with the CustomNext-Cancer gene panel (a combination of OvaNext and PancNext) offered through [facility].  2) This information should be available in 3-4 weeks.  3) Osmel will be contacted once results are available.    Face to face time: 40 minutes    Miladis Andrade MS, Located within [facility]  Licensed Genetic Counselor  [phone]

## 2019-02-01 ENCOUNTER — THERAPY VISIT (OUTPATIENT)
Dept: PHYSICAL THERAPY | Facility: CLINIC | Age: 34
End: 2019-02-01
Payer: OTHER MISCELLANEOUS

## 2019-02-01 DIAGNOSIS — M54.50 ACUTE RIGHT-SIDED LOW BACK PAIN WITHOUT SCIATICA: ICD-10-CM

## 2019-02-01 PROCEDURE — 97140 MANUAL THERAPY 1/> REGIONS: CPT | Mod: GP | Performed by: PHYSICAL THERAPIST

## 2019-02-01 PROCEDURE — 97530 THERAPEUTIC ACTIVITIES: CPT | Mod: GP | Performed by: PHYSICAL THERAPIST

## 2019-02-01 PROCEDURE — 97110 THERAPEUTIC EXERCISES: CPT | Mod: GP | Performed by: PHYSICAL THERAPIST

## 2019-02-01 NOTE — PROGRESS NOTES
Subjective:  HPI                    Objective:  System         Lumbar/SI Evaluation    Lumbar Myotomes:  normal                                                                           Sara Lumbar Evaluation      Movement Loss:  Flexion (Flex): nil  Extension (EXT): nil  Side Glide R (SG R): nil  Side [place] L (SG L): nil                                               ROS    Assessment/Plan:    DISCHARGE REPORT    Progress reporting period is from 12/17/18 to 2/1/19.       SUBJECTIVE  Subjective changes noted by patient:  Osmel reports that he is doing very well. Weaning off the pressups to 3x/day. Tried some walk/jog on the treadmill last weekned and noticed some soreness in the center of his back which lasted until the next day. Sitting OK beyond two hours. No pain waking. Close to 100%.       Current pain level is 0/10  .     Previous pain level was  4/10  .   Changes in function:  Yes (See Goal flowsheet attached for changes in current functional level)  Adverse reaction to treatment or activity: None    OBJECTIVE  Changes noted in objective findings:  Yes, See physical exam section and/or daily flowsheet for response to repeated movements.           ASSESSMENT/PLAN  Updated problem list and treatment plan: Diagnosis 1:  R unilateral above knee lumbar derangement  Pain -  manual therapy, self management, education, directional preference exercise and home program  Decreased ROM/flexibility - manual therapy, therapeutic exercise, therapeutic activity and home program  Inflammation - self management/home program  Decreased function - therapeutic activities and home program  Impaired posture - neuro re-education, therapeutic activities and home program  STG/LTGs have been met or progress has been made towards goals:  Yes (See Goal flow sheet completed today.)  Assessment of Progress: The patient has met all of their long term goals.  Self Management Plans:  Patient is independent in a home treatment  program.  Patient is independent in self management of symptoms.  I have re-evaluated this patient and find that the nature, scope, duration and intensity of the therapy is appropriate for the medical condition of the patient.  Osmel continues to require the following intervention to meet STG and LTG's:  PT intervention is no longer required to meet STG/LTG.    Recommendations:  This patient is ready to be discharged from therapy and continue their home treatment program.    Please refer to the daily flowsheet for treatment today, total treatment time and time spent performing 1:1 timed codes.

## 2019-02-01 NOTE — LETTER
[facility] PHYSICAL THERAPY  [address] [facility]  [place] [id number] [phone]    2019    Re: Osmel Mccloud   :   1985  MRN:  [id number]   REFERRING PHYSICIAN:   Paige Rea    [facility]  Visits:  Rxs Used: 6  Reason for Referral:  Acute right-sided low back pain without sciatica        DISCHARGE REPORT    Progress reporting period is from 18 to 19.       SUBJECTIVE  Subjective changes noted by patient:  Osmel reports that he is doing very well. Weaning off the pressups to 3x/day. Tried some walk/jog on the treadmill last weekned and noticed some soreness in the center of his back which lasted until the next day. Sitting OK beyond two hours. No pain waking. Close to 100%.       Current pain level is 0/10  .     Previous pain level was  4/10  .   Changes in function:  Yes (See Goal flowsheet attached for changes in current functional level)  Adverse reaction to treatment or activity: None    OBJECTIVE  Changes noted in objective findings:  Yes, See physical exam section and/or daily flowsheet for response to repeated movements.     Lumbar/SI Evaluation  Lumbar Myotomes:  normal    Sara Lumbar Evaluation  Movement Loss:  Flexion (Flex): nil  Extension (EXT): nil  Side Glide R (SG R): nil  Side [place] L (SG L): nil          ASSESSMENT/PLAN  Updated problem list and treatment plan: Diagnosis 1:  R unilateral above knee lumbar derangement  Pain -  manual therapy, self management, education, directional preference exercise and home program  Decreased ROM/flexibility - manual therapy, therapeutic exercise, therapeutic activity and home program  Inflammation - self management/home program  Decreased function - therapeutic activities and home program  Impaired posture - neuro re-education, therapeutic activities and home program  STG/LTGs have been met or progress has been made  towards goals:  Yes (See Goal flow sheet completed today.)  Assessment of Progress: The patient has met all of their long term goals.  Self Management Plans:  Patient is independent in a home treatment program.  Patient is independent in self management of symptoms.  I have re-evaluated this patient and find that the nature, scope, duration and intensity of the therapy is appropriate for the medical condition of the patient.  Osmel continues to require the following intervention to meet STG and LTG's:  PT intervention is no longer required to meet STG/LTG.    Recommendations:  This patient is ready to be discharged from therapy and continue their home treatment program.  Thank you for your referral.    INQUIRIES  Therapist: Felix Daugherty DPT  [facility] PHYSICAL THERAP[facility]  [address] [place] [address]. [id number]  [facility] [address]  Phone: [phone]  Fax: [phone]

## 2019-02-08 LAB — LAB SCANNED RESULT: ABNORMAL

## 2019-02-22 ENCOUNTER — VIRTUAL VISIT (OUTPATIENT)
Dept: ONCOLOGY | Facility: CLINIC | Age: 34
End: 2019-02-22
Attending: GENETIC COUNSELOR, MS
Payer: COMMERCIAL

## 2019-02-22 DIAGNOSIS — Z15.02 HEREDITARY BREAST AND OVARIAN CANCER SYNDROME ASSOCIATED WITH MUTATION IN BRCA1 GENE: Primary | ICD-10-CM

## 2019-02-22 DIAGNOSIS — Z15.01 HEREDITARY BREAST AND OVARIAN CANCER SYNDROME ASSOCIATED WITH MUTATION IN BRCA1 GENE: Primary | ICD-10-CM

## 2019-02-22 DIAGNOSIS — Z80.3 FAMILY HISTORY OF MALIGNANT NEOPLASM OF BREAST: ICD-10-CM

## 2019-02-22 DIAGNOSIS — Z80.0 FAMILY HISTORY OF PANCREATIC CANCER: ICD-10-CM

## 2019-02-22 DIAGNOSIS — Z80.41 FAMILY HISTORY OF MALIGNANT NEOPLASM OF OVARY: ICD-10-CM

## 2019-02-22 PROCEDURE — 40000114 ZZH STATISTIC NO CHARGE CLINIC VISIT

## 2019-02-22 NOTE — Clinical Note
Please print and send a copy of this letter and the patient's genetic testing report to the patient.    Please enclose test report: SEND OUTS MISCELLANEOUS [[id number]] (Order [id number])

## 2019-02-22 NOTE — LETTER
Cancer Risk Management  Program Locations    [facility]  [facility]  Mailing Address  Cancer Risk Management Program  [facility]  [address]  [place] [address]    New patient appointments  [phone]  February 26, 2019    Osmel Mccloud  [address] [place] [address]      Dear Relative:  The purpose of this letter is to inform you that your relative recently underwent genetic counseling and genetic testing due to the family history of cancer.  The testing done through [facility] identified a mutation in the BRCA1 gene.  Specifically, the mutation is called p.*.  The accession number linked to your relative's test report is [id number].  A mutation (or change in the genetic code) causes a specific gene to stop working properly.  Ultimately, individuals who have a mutation in this BRCA1 gene have a diagnosis of hereditary breast and ovarian cancer syndrome.    BRCA1 mutations increase the lifetime risk of breast cancer up to 50-85%.  They also increase the lifetime risk for ovarian cancer up to 20-40%.  Men with this BRCA1 mutation are at increased risk for male breast cancer and prostate cancer.  Some families with BRCA1 mutations show increased risk of pancreatic cancer or melanoma.  In addition, both men and women have a 50% chance of passing this mutation on to each of their children.  If individuals are found to have a mutation in the BRCA1 gene, we would recommend increased cancer screening at younger ages.  Risk reduction surgeries are also available options that can prevent the development of certain cancers.    In rare situations in which both parents have a mutation in the BRCA1 gene, their children each may have a 25% risk for Fanconi anemia. Fanconi anemia is a rare condition with onset  in childhood.  Fanconi anemia often results in physical abnormalities, growth problems, bone marrow failure, and increased risk for cancer.  For individuals of childbearing age with BRCA1 mutations, genetic counseling and genetic testing may be advised for their partners.    I understand that this may be surprising, unexpected, and even scary news.  Because this mutation has been identified in your family, you are at risk for having the same mutation.  As mentioned earlier, your children may also be at risk.  Thus, I encourage you to contact me with questions or to schedule a genetic counseling appointment.  If you do not live in the [place] campus area, I would be happy to provide you with contact information for genetic counselors in your city or state.  Genetic counselors can be found by visiting [email or web address].  Scheduling a genetic counseling appointment does not mean you have to undergo genetic testing.  The decision to pursue such testing is a very personal one that is discussed in more detail during the session.  Indeed, much of cancer genetic counseling is providing valuable information to individuals who are impacted by genetic information such as this.    Please feel free to contact me with any questions or concerns.  I can be reached at [phone].  Sincerely,   Miladis Andrade MS, [facility]  Licensed Genetic Counselor  [phone]

## 2019-02-22 NOTE — LETTER
Cancer Risk Management  Program [facility]  [facility]  Mailing Address  Cancer Risk Management Program  [facility]  [address]  [place] [address]    New patient appointments  [phone]  February 26, 2019    Osmel Mccloud  [address] [place] [address]      Dear Osmel,    It was a pleasure speaking with you on the phone on 2/22/2019.  Here is a copy of the progress note from our discussion.  If you have any additional questions, please feel free to call.    Presenting Information:   I spoke to Osmel by phone today to discuss his genetic testing results. His blood was drawn on 1/25/2019. CustomNext-Cancer testing (a combination of OvaNext and PancNext) was ordered from [facility]. This testing was done because of Osmel's family history of a BRCA1 gene mutation, breast, ovarian and pancreatic cancer.    Genetic Testing Results: POSITIVE  Osmel is POSITIVE for a BRCA1 mutation. This mutation is called p.*, which has previously been identified in Osmel's sister and paternal relatives. We discussed that this mutation is associated with a diagnosis of Hereditary Breast and Ovarian Cancer syndrome and increased risk for certain cancers. We discussed the impact of this testing on Osmel in detail.     Of note, Osmel tested negative for mutations in the following genes by sequencing and deletion/duplication analysis: APC, MATTHEW, BARD1, BRCA2, BRIP1, CDH1, CDKN2A, CHEK2, DICER1, MLH1, MRE11A, MSH2, MSH6, MUTYH, NBN, NF1, PALB2, PMS2, PTEN, RAD50, RAD51C, RAD51D, SMARCA4, STK11 and TP53 (sequencing and deletion/duplication); EPCAM (deletion/duplication only).  We reviewed the autosomal dominant inheritance of these genes. Osmel cannot pass on a mutation in any of these genes to his  children based on this test result. Mutations in these genes do not skip generations.      BRCA1 Cancer Risks:  Men with mutations in BRCA1 have a:      Approximately 20% lifetime prostate cancer risk.  This is greater than the general population risk of approximately 16%.      1.2% lifetime male breast cancer risk. This is greater than the general population risk of less than 1%.     Women with mutations in the BRCA1 gene have a:    57-87% lifetime risk of developing breast cancer. This is much greater than the general population breast cancer risk of 12%    Up to 40% lifetime risk of developing ovarian cancer. This is much greater than the general population ovarian cancer risk of 1-2%    Lifetime pancreatic cancer risk and melanoma risk in both males and females may be increased as well. Also, though no exact lifetime risks are available at this time, there may be increased risks for other cancers.     Cancer Screening and Prevention:  The following screening is recommended for women who have a mutation in the BRCA1 gene, per current National Comprehensive Cancer Network (NCCN) guidelines.    Breast awareness starting at age 18    Clinical breast exams starting at age 25; repeated every 6-12 months    Annual breast MRI from age 25-29    Annual mammograms with consideration of tomosynthesis and breast MRI alternating every 6 months starting at age 30    Consider transvaginal ultrasound and a  blood test starting at age 30-35, though the benefits of this screening are unclear.  Due to ovarian cancer screening limitations, prophylactic removal of the ovaries and fallopian tubes is an option and is recommended after women are finished planning their families or between 35 and 40    Prophylactic mastectomy is a surgical option, which reduces breast cancer risk by 90%. Chemoprevention with Tamoxifen can reduce breast cancer risk in some women.  Surgical risk reduction options and Tamoxifen use should be discussed  with a women's physician.      The following screening is recommended for men who have a mutation in the BRCA1 gene:    Breast self-exam starting at age 35    Annual clinical breast exams starting at age 35    Consider annual prostate exams and PSA testing starting at age 45     Screening for pancreatic cancer is not offered on a routine basis, as the benefits of current screening methods are unknown. Pancreatic cancer screening may be considered based on family history, though.  The BRCA1 gene mutation identified in Osmel has been previously identified in his paternal family, which is not significant for any reported pancreatic cancer.  However, his maternal family history is significant for his grandfather's pancreatic cancer diagnosis at age 77, and Osmel is encouraged to follow up should there be changes to his family history.  Screening for melanoma may also be considered.  Some chemotherapies for certain cancers may be more effective in individuals with BRCA mutations. Osmel should discuss this with his physicians, if chemotherapy is indicated for him in the future.     We discussed that Osmel could participate in our Cancer Risk Management Program in which our nursing specialist provides an individual screening plan and assists with medical management. A referral was placed to see LONNIE Olivera for this service.  He has an appointment scheduled at the [facility] for 2/27/2019.       Of note, the above information is based on our current understanding of Osmel's genetic findings. Osmel is encouraged to reach out to me regularly regarding any pertinent updates to his personal and/or family history of cancer, as our understanding of the genetic findings in his family may change over time.     Implications for Family Members:  We reviewed that mutations in the BRCA1 gene are inherited in an autosomal dominant pattern. This means that future children would each have a 50% chance of  "inheriting the same mutation. Likewise, each of his siblings has a 50% risk of having the same mutation.  I am happy to help his relatives connect with a genetic counselor in their area if they would like to discuss testing.    In rare situations in which both parents have a mutation in the BRCA1 gene, their children each have a 25% risk for Fanconi anemia. Fanconi anemia is a rare condition with onset in childhood that often results in physical abnormalities, problems with growth, bone marrow failure, and increased risk for cancer. For individuals of childbearing age with BRCA1 mutations, genetic counseling and genetic testing may be advised for their partners.    Additional Testing Considerations:  Other relatives may carry a different gene mutation associated with pancreatic cancer due to his maternal family history. Genetic counseling is recommended for close relatives to discuss genetic testing options.  If any of these relatives do pursue genetic testing, Osmel is encouraged to contact me so that we may review the impact of their test results on Osmel.    Support Resources:  Information regarding national and local support resources will be provided, including Facing Our Risk of Cancer Empowered (FORCE).    Plan:  1.  A copy of the test results and support resources will be mailed to Osmel.  2.  I will provide a \"Dear Relative\" letter for Osmel to share with his family members.  3.  He plans to follow up with his managing physicians.  4.   A referral was placed for Osmel to meet with LONNIE Olivera to discuss screening associated with a BRCA1 mutation.    If Osmel has additional questions, I encouraged him to contact me directly at [phone].     Miladis Andrade MS, [facility]  Licensed Genetic Counselor  [phone]  "

## 2019-02-22 NOTE — PROGRESS NOTES
"2/22/2019    Referring Provider: self referred    Presenting Information:   I spoke to Osmel by phone today to discuss his genetic testing results. His blood was drawn on 1/25/2019. CustomNext-Cancer testing (a combination of OvaNext and PancNext) was ordered from [facility]. This testing was done because of Osmel's family history of a BRCA1 gene mutation, breast, ovarian and pancreatic cancer.    Genetic Testing Results: POSITIVE  Osmel is POSITIVE for a BRCA1 mutation. This mutation is called p.*, which has previously been identified in Osmel's sister and paternal relatives. We discussed that this mutation is associated with a diagnosis of Hereditary Breast and Ovarian Cancer syndrome and increased risk for certain cancers. We discussed the impact of this testing on Osmel in detail.     Of note, Osmel tested negative for mutations in the following genes by sequencing and deletion/duplication analysis: APC, MATTHEW, BARD1, BRCA2, BRIP1, CDH1, CDKN2A, CHEK2, DICER1, MLH1, MRE11A, MSH2, MSH6, MUTYH, NBN, NF1, PALB2, PMS2, PTEN, RAD50, RAD51C, RAD51D, SMARCA4, STK11 and TP53 (sequencing and deletion/duplication); EPCAM (deletion/duplication only).  We reviewed the autosomal dominant inheritance of these genes. Osmel cannot pass on a mutation in any of these genes to his children based on this test result. Mutations in these genes do not skip generations.      A copy of the test report can be found in the Laboratory tab, dated 1/25/2019, and named \"SEND OUTS MISC TEST\". The report is scanned in as a linked document.    BRCA1 Cancer Risks:  Men with mutations in BRCA1 have a:      Approximately 20% lifetime prostate cancer risk.  This is greater than the general population risk of approximately 16%.      1.2% lifetime male breast cancer risk. This is greater than the general population risk of less than 1%.     Women with mutations in the BRCA1 gene have a:    57-87% lifetime risk of developing breast " cancer. This is much greater than the general population breast cancer risk of 12%    Up to 40% lifetime risk of developing ovarian cancer. This is much greater than the general population ovarian cancer risk of 1-2%    Lifetime pancreatic cancer risk and melanoma risk in both males and females may be increased as well. Also, though no exact lifetime risks are available at this time, there may be increased risks for other cancers.     Cancer Screening and Prevention:  The following screening is recommended for women who have a mutation in the BRCA1 gene, per current National Comprehensive Cancer Network (NCCN) guidelines.    Breast awareness starting at age 18    Clinical breast exams starting at age 25; repeated every 6-12 months    Annual breast MRI from age 25-29    Annual mammograms with consideration of tomosynthesis and breast MRI alternating every 6 months starting at age 30    Consider transvaginal ultrasound and a  blood test starting at age 30-35, though the benefits of this screening are unclear.  Due to ovarian cancer screening limitations, prophylactic removal of the ovaries and fallopian tubes is an option and is recommended after women are finished planning their families or between 35 and 40    Prophylactic mastectomy is a surgical option, which reduces breast cancer risk by 90%. Chemoprevention with Tamoxifen can reduce breast cancer risk in some women.  Surgical risk reduction options and Tamoxifen use should be discussed with a women's physician.      The following screening is recommended for men who have a mutation in the BRCA1 gene:    Breast self-exam starting at age 35    Annual clinical breast exams starting at age 35    Consider annual prostate exams and PSA testing starting at age 45     Screening for pancreatic cancer is not offered on a routine basis, as the benefits of current screening methods are unknown. Pancreatic cancer screening may be considered based on family history,  though.  The BRCA1 gene mutation identified in Osmel has been previously identified in his paternal family, which is not significant for any reported pancreatic cancer.  However, his maternal family history is significant for his grandfather's pancreatic cancer diagnosis at age 77, and Osmel is encouraged to follow up should there be changes to his family history.  Screening for melanoma may also be considered.  Some chemotherapies for certain cancers may be more effective in individuals with BRCA mutations. Osmel should discuss this with his physicians, if chemotherapy is indicated for him in the future.     We discussed that Osmel could participate in our Cancer Risk Management Program in which our nursing specialist provides an individual screening plan and assists with medical management. A referral was placed to see LONNIE Olivera for this service.  He has an appointment scheduled at the [facility] for 2/27/2019.       Of note, the above information is based on our current understanding of Osmel's genetic findings. Osmel is encouraged to reach out to me regularly regarding any pertinent updates to his personal and/or family history of cancer, as our understanding of the genetic findings in his family may change over time.     Implications for Family Members:  We reviewed that mutations in the BRCA1 gene are inherited in an autosomal dominant pattern. This means that future children would each have a 50% chance of inheriting the same mutation. Likewise, each of his siblings has a 50% risk of having the same mutation.  I am happy to help his relatives connect with a genetic counselor in their area if they would like to discuss testing.    In rare situations in which both parents have a mutation in the BRCA1 gene, their children each have a 25% risk for Fanconi anemia. Fanconi anemia is a rare condition with onset in childhood that often results in physical abnormalities, problems with  "growth, bone marrow failure, and increased risk for cancer. For individuals of childbearing age with BRCA1 mutations, genetic counseling and genetic testing may be advised for their partners.    Additional Testing Considerations:  Other relatives may carry a different gene mutation associated with pancreatic cancer due to his maternal family history. Genetic counseling is recommended for close relatives to discuss genetic testing options.  If any of these relatives do pursue genetic testing, Osmel is encouraged to contact me so that we may review the impact of their test results on Osmel.    Support Resources:  Information regarding national and local support resources will be provided, including Facing Our Risk of Cancer Empowered (FORCE).    Plan:  1.  A copy of the test results and support resources will be mailed to Osmel.  2.  I will provide a \"Dear Relative\" letter for Osmel to share with his family members.  3.  He plans to follow up with his managing physicians.  4.   A referral was placed for Osmel to meet with LONNIE Olivera to discuss screening associated with a BRCA1 mutation.    If Osmel has additional questions, I encouraged him to contact me directly at [phone].     Time spent over phone: 8 minutes    Miladis Andrade MS, [facility]  Licensed Genetic Counselor  [phone]            "

## 2019-02-26 ENCOUNTER — TELEPHONE (OUTPATIENT)
Dept: ONCOLOGY | Facility: CLINIC | Age: 34
End: 2019-02-26

## 2019-02-26 PROBLEM — Z15.09 BRCA1 GENE MUTATION POSITIVE: Status: ACTIVE | Noted: 2019-01-25

## 2019-02-26 PROBLEM — Z15.01 BRCA1 GENE MUTATION POSITIVE: Status: ACTIVE | Noted: 2019-01-25

## 2019-02-26 NOTE — TELEPHONE ENCOUNTER
ONCOLOGY INTAKE: Records Information      APPT INFORMATION: 2/27/19 at 2:00PM  Referring provider:  ROMEO Bermudez  Referring provider s clinic:  [facility]  Reason for visit/diagnosis:  Cancer Risk Management - High Risk for Hereditary BRCA1 Gene    Were the records received with the referral (via [employer])? In Epic    Has patient been seen for any external appt for this diagnosis (enter clinic/location)? No - per pt genetics were only done at [facility].

## 2019-02-26 NOTE — PROGRESS NOTES
Oncology Risk Management Consultation:  Date on this visit: 2019    Osmel Mccloud  is referred by Miladis Andrade, Licensed Genetic Counselor,  for an oncology risk management consultation. He requires evaluation to reduce his risk of cancer secondary to having a deleterious BRCA1 mutation and is considered to be at high risk for hereditary male breast and prostate cancer. Also present is his significant other, Shawnee, and Alyce Izaguirre, DNP student at the [employer].    Primary Physician: No Ref-Primary, Physician     History Of Present Illness:  Mr. Mccloud is a very pleasant, healthy 33 year old male who presents with family history of breast cancer and a personal diagnosis of a BRCA1 mutation.     Genetic Testin2019 --  POSITIVE for a BRCA1 mutation. This mutation is called p.* identified CustomNext-Cancer testing (a combination of OvaNext and PancNext) from [facility]. This testing was done because of Osmel's family history of a BRCA1 gene mutation, breast, ovarian and pancreatic cancer. This mutation has previously been identified in Osmel's sister and paternal relatives.      Of note, Osmel tested negative for mutations in the following genes by sequencing and deletion/duplication analysis: APC, MATTHEW, BARD1, BRCA2, BRIP1, CDH1, CDKN2A, CHEK2, DICER1, MLH1, MRE11A, MSH2, MSH6, MUTYH, NBN, NF1, PALB2, PMS2, PTEN, RAD50, RAD51C, RAD51D, SMARCA4, STK11 and TP53 (sequencing and deletion/duplication); EPCAM (deletion/duplication only).      Pertinent screening history:  None to date.    Past Medical/Surgical History:  Past Medical History:   Diagnosis Date     BRCA1 gene mutation positive 2019     POSITIVE for a BRCA1 mutation. This mutation is called p.* ; elevated risk for male breast cancer and prostate cancer     No past surgical history on file.    Allergies:  Allergies as of 2019 - Reviewed 2019   Allergen Reaction Noted     Amoxicillin-pot clavulanate  Rash, Hives, and Unknown 2012     Citalopram Anxiety 2015     Escitalopram Anxiety 2015     Amoxicillin       Clavulanic acid       Penicillins         Current Medications:  Current Outpatient Medications   Medication Sig Dispense Refill     ALPRAZolam (XANAX) 0.5 MG tablet TK 1 T PO BEFORE A FLIGHT UTD  0     cyclobenzaprine (FLEXERIL) 10 MG tablet TK 1 T PO TID FOR 10 DAYS PRN  0     lidocaine HCl 3 % cream NAA EXT AA TID FOR 10 DAYS PRN  0        Family History:  Family History   Problem Relation Age of Onset     Hereditary Breast and Ovarian Cancer Syndrome Sister         BRCA1+; no cancer, same genetic mutation as patient     Pancreatic Cancer Maternal Grandfather 77         at 78     Breast Cancer Other         paternal great grandmother     Ovarian Cancer Other         paternal great grandmother     Breast Cancer Paternal Aunt 33     Hereditary Breast and Ovarian Cancer Syndrome Paternal Aunt         BRCA1+     Breast Cancer Cousin 36        paternal cousin     Hereditary Breast and Ovarian Cancer Syndrome Cousin         BRCA1+       Social History:  Social History     Socioeconomic History     Marital status: Single     Spouse name: Not on file     Number of children: 0     Years of education: Not on file     Highest education level: Not on file   Occupational History     Employer: [employer]   Social Needs     Financial resource strain: Not on file     Food insecurity:     Worry: Not on file     Inability: Not on file     Transportation needs:     Medical: Not on file     Non-medical: Not on file   Tobacco Use     Smoking status: Never Smoker     Smokeless tobacco: Never Used   Substance and Sexual Activity     Alcohol use: Not on file     Drug use: Not on file     Sexual activity: Not on file   Lifestyle     Physical activity:     Days per week: Not on file     Minutes per session: Not on file     Stress: Not on file   Relationships     Social connections:     Talks on phone:  "Not on file     Gets together: Not on file     Attends Temple service: Not on file     Active member of club or organization: Not on file     Attends meetings of clubs or organizations: Not on file     Relationship status: Not on file     Intimate partner violence:     Fear of current or ex partner: Not on file     Emotionally abused: Not on file     Physically abused: Not on file     Forced sexual activity: Not on file   Other Topics Concern     Not on file   Social History Narrative     Not on file       Review of Systems:  GENERAL: No change in weight, sleep or appetite.  Normal energy.  No fever or chills  EYES: Negative for vision changes or eye problems  ENT: No problems with ears, nose or throat.  No difficulty swallowing.  RESP: No coughing, wheezing or shortness of breath  CV: No chest pains or palpitations  GI: No nausea, vomiting,  heartburn, abdominal pain, diarrhea, constipation or change in bowel habits; reports anal fissure identified on recent colonoscopy (no records available)  : No urinary frequency or dysuria, bladder or kidney problems  MUSCULOSKELETAL: No significant muscle or joint pains  NEUROLOGIC: No headaches, numbness, tingling, weakness, problems with balance or coordination  PSYCHIATRIC: No problems with anxiety, depression or mental health  HEME/IMMUNE/ALLERGY: No history of bleeding or clotting problems or anemia.  No allergies or immune system problems  ENDOCRINE: No history of thyroid disease, diabetes or other endocrine disorders  SKIN: No rashes,worrisome lesions or skin problems    Physical Exam:  /74   Pulse 71   Temp 97.6  F (36.4  C)   Resp 18   Ht 1.778 m (5' 10\")   Wt 100.2 kg (221 lb)   SpO2 97%   BMI 31.71 kg/m      GENERAL APPEARANCE: healthy, alert and no distress     NECK: no adenopathy, no asymmetry or masses     RESP: lungs clear to auscultation - no rales, rhonchi or wheezes    BREAST: A multipositional, bilateral breast exam was performed.  " Symmetrical, very minimal breast tissue. Right breast: no palpable dominant masses, no nipple discharge, no skin changes. Dense tissue.  Right axilla: no palpable adenopathy. Left breast: no palpable dominant masses, no nipple discharge, no skin changes. Left axilla: no palpable adenopathy. Dense tissue.  LYMPHATICS: No cervical, supraclavicular, axillary or inguinal lymphadenopathy       SKIN: no suspicious lesions or rashes on anterior or posterior torso, upper extremities or face.    Laboratory/Imaging Studies  Results for orders placed or performed in visit on 01/25/19   [facility] Genetics lab: Laboratory Miscellaneous Order   Result Value Ref Range    Miscellaneous Test         Specimen Received, Reordered and sent to Performing laboratory - Report to follow upon   completion.     Send outs misc test   Result Value Ref Range    Lab Scanned Result SEND OUTS MISC TEST-Scanned (A)        ASSESSMENT  We discussed the differences between cancer risk for the general population and those with BRCA mutations. Women with BRCA mutations have a 40-80% lifetime risk of breast cancer, compared to the general population risk of 12%. Those with a BRCA mutation also bear a 10-40% lifetime risk of ovarian cancer, compared to the 1-2% lifetime risk within the general population.    We also discussed that inheriting a mutation does not mean that a person will develop cancer, but rather that they are at increased risk.       Osmel has an understanding that the BRCA1 gene, located on chromosome 17, is involved in both DNA repair and  the regulation of cell cycle checkpoints in response to DNA damage. The overall presence of genetic mutations in BRCA1 is estimated to be between 1:300 to 1:800 people.      Female BRCA1+ carriers have between a 46-87% lifetime risk for breast cancer and if diagnosed, are found to have a 21.1% risk for a second primary breast cancer within 10 years and 83% risk for second primary cancer  to age 70.  The risk for ovarian cancer is estimated to be between 39-63%, as compared to a 1-2% risk for the general population. Risk for male breast cancer for BRCA1+ carriers is estimated to be 1.2% as compared to a 0.1% risk for men in the general population.  The risk for prostate cancer for male BRCA1+ carriers is estimated to be 8.6% by age 65, as compared to 6% for men in the general population by age 69. The risk for pancreatic cancer is estimated to be between 1-3%, as compared with the general population risk of 0.50%.  (all data from Eileen et all 2016, Gene Reviews).    We also discussed following  a healthy lifestyle plan recommended by both NCCN and the American Cancer Society that can reduce the risk of  cancer:  1 Limit alcohol consumption to less than 1 drink per day (1 drink=5 oz.wine, 12 oz. Beer or 1.5 oz. 80-proof liquor). He is well within these guidelines.    2. Exercise per American Cancer Society guidelines of at least 150 minutes of moderate-intensity activity or 75 minutes of vigorous activity each week. (Or a combination of both.) Exercise should be spread  out over the week. 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 But states he hurt his back recently and has been on light duty for several weeks.    3. Maintain a healthy weight with a Body Mass Index between 19-24.9. His BMI is 31.78. We discussed bringing his weight down closer to achieve a BMI of 25.  4. Do not use tobacco products and limit exposure to passive smoke. He does not smoke.     Individualized Surveillance Plan  BRCA1 and BRCA2 Management for Men   Per NCCN Guidelines Version 1.2019   Test or procedure Last done Next Scheduled    Breast self exam training and education, starting at age 35.      Clinical breast exam, every 12 months starting at age 35.     Prostate cancer screening recommended for BRCA2 carriers starting at age 45.    Consider prostate cancer  screening for BRCA1 carriers, starting at age 45.     No specific guidelines for pancreatic cancer and melanoma exist, but screening may be individualized based on cancers observed in the family.     Consider full body skin and eye exam for melanoma and investigational protocols for pancreatic cancer screening.     The International Cancer of the Pancreas Screening (CAPS) Consortium recommends that individuals with a BRCA2 mutation who have at least one first-degree relative with pancreatic cancer should undergo initial screening with endoscopic ultrasonography and/or MRI/magnetic resonance cholangiopancreatography     He is doing well today and has no immediate concerns. We discussed prostate health and that he has no prostate cancer in his family but could consider screening around age 45.  Guidelines may be different by then for BRCA1+ carriers, so he is welcome to check back with me in the future with any questions.    We also discussed his heightened risk for melanoma and the ABCDs of identifying melanoma.  He expressed understanding.  We reviewed the screening plan below and he can follow up either with me or his primary care provider.                                                          I spent 38 minutes with the patient with greater that 50% of it in counseling and coordinating care as documented above.    Any Colbert, DEMETRIS-CNS, OCN, AGN-BC  Clinical Nurse Specialist  Cancer Risk Management Program  [address]  [place] [address]    phone:  [phone]  Pager: [phone]  fax: [phone]      CC: ROMEO Bermudez

## 2019-02-26 NOTE — TELEPHONE ENCOUNTER
RECORDS STATUS - BREAST    RECORDS REQUESTED FROM: Epic   DATE REQUESTED: 2/26/19   NOTES DETAILS STATUS   OFFICE NOTE from referring provider  Epic   OFFICE NOTE from medical oncologist     OFFICE NOTE from surgeon     OFFICE NOTE from radiation oncologist     DISCHARGE SUMMARY from hospital     DISCHARGE REPORT from the      OPERATIVE REPORT     MEDICATION LIST     CLINICAL TRIAL TREATMENTS TO DATE     LABS     PATHOLOGY REPORTS  (Tissue diagnosis, Stage, ER/NV percentage positive and intensity of staining, HER2 IHC, FISH, and all biopsies from breast and any distant metastasis)                     GENONOMIC TESTING     TYPE:   (Next Generation Sequencing, including Foundation One testing, and Oncotype score)  Epic   IMAGING (NEED IMAGES & REPORT)     CT SCANS     MRI     MAMMO     ULTRASOUND     PET     BONE SCAN     BRAIN MRI

## 2019-02-27 ENCOUNTER — ONCOLOGY VISIT (OUTPATIENT)
Dept: ONCOLOGY | Facility: CLINIC | Age: 34
End: 2019-02-27
Attending: GENETIC COUNSELOR, MS
Payer: COMMERCIAL

## 2019-02-27 ENCOUNTER — PRE VISIT (OUTPATIENT)
Dept: ONCOLOGY | Facility: CLINIC | Age: 34
End: 2019-02-27

## 2019-02-27 VITALS
WEIGHT: 221 LBS | HEART RATE: 71 BPM | RESPIRATION RATE: 18 BRPM | BODY MASS INDEX: 31.64 KG/M2 | SYSTOLIC BLOOD PRESSURE: 131 MMHG | DIASTOLIC BLOOD PRESSURE: 74 MMHG | TEMPERATURE: 97.6 F | HEIGHT: 70 IN | OXYGEN SATURATION: 97 %

## 2019-02-27 DIAGNOSIS — Z15.01 BRCA1 POSITIVE: Primary | ICD-10-CM

## 2019-02-27 DIAGNOSIS — Z15.09 BRCA1 POSITIVE: Primary | ICD-10-CM

## 2019-02-27 PROCEDURE — 99214 OFFICE O/P EST MOD 30 MIN: CPT | Performed by: CLINICAL NURSE SPECIALIST

## 2019-02-27 RX ORDER — ALPRAZOLAM 0.5 MG
TABLET ORAL
Refills: 0 | COMMUNITY
Start: 2018-11-21

## 2019-02-27 RX ORDER — CYCLOBENZAPRINE HCL 10 MG
TABLET ORAL
Refills: 0 | COMMUNITY
Start: 2019-01-04

## 2019-02-27 RX ORDER — LIDOCAINE HYDROCHLORIDE 30 MG/G
CREAM TOPICAL
Refills: 0 | COMMUNITY
Start: 2018-11-29

## 2019-02-27 ASSESSMENT — PAIN SCALES - GENERAL: PAINLEVEL: NO PAIN (0)

## 2019-02-27 ASSESSMENT — MIFFLIN-ST. JEOR: SCORE: 1963.7

## 2019-02-27 NOTE — NURSING NOTE
"Oncology Rooming Note    February 27, 2019 1:44 PM   Osmel Mccloud is a 33 year old male who presents for:    Chief Complaint   Patient presents with     Family History Of Cancer     Initial Vitals: /74   Pulse 71   Temp 97.6  F (36.4  C)   Resp 18   Ht 1.778 m (5' 10\")   Wt 100.2 kg (221 lb)   SpO2 97%   BMI 31.71 kg/m   Estimated body mass index is 31.71 kg/m  as calculated from the following:    Height as of this encounter: 1.778 m (5' 10\").    Weight as of this encounter: 100.2 kg (221 lb). Body surface area is 2.22 meters squared.  No Pain (0) Comment: Data Unavailable   No LMP for male patient.  Allergies reviewed: Yes  Medications reviewed: Yes    Medications: Medication refills not needed today.  Pharmacy name entered into EPIC: Data Unavailable           Shara Brownlee LPN              "

## 2019-02-27 NOTE — PATIENT INSTRUCTIONS
Individualized Surveillance Plan  BRCA1 and BRCA2 Management for Men   Per NCCN Guidelines Version 1.2019   Test or procedure Last done Next Scheduled    Breast self exam training and education, starting at age 35.      Clinical breast exam, every 12 months starting at age 35.  .   Prostate cancer screening recommended for BRCA2 carriers starting at age 45.    Consider prostate cancer screening for BRCA1 carriers, starting at age 45.     No specific guidelines for pancreatic cancer and melanoma exist, but screening may be individualized based on cancers observed in the family.     Consider full body skin and eye exam for melanoma and investigational protocols for pancreatic cancer screening.     The International Cancer of the Pancreas Screening (CAPS) Consortium recommends that individuals with a BRCA2 mutation who have at least one first-degree relative with pancreatic cancer should undergo initial screening with endoscopic ultrasonography and/or MRI/magnetic resonance cholangiopancreatography

## 2019-02-27 NOTE — LETTER
Cancer Risk Management  Program Locations    [facility]  [facility]  Mailing Address  Cancer Risk Management Program  [facility]  [address]  [place] [address]    New patient appointments  [phone]  2019    Osmel Mccloud  [address]  [facility] [id number]      Dear Osmel,    It was a pleasure meeting with you today.  Below is a copy of my note from our visit, outlining your surveillance plan.   Please feel free to contact me if you have any questions or concerns.      Oncology Risk Management Consultation:  Date on this visit: 2019    Osmel Mccloud  is referred by Miladis Andrade, Licensed Genetic Counselor,  for an oncology risk management consultation. He requires evaluation to reduce his risk of cancer secondary to having a deleterious BRCA1 mutation and is considered to be at high risk for hereditary male breast and prostate cancer. Also present is his significant other, Shawnee, and Alyce Izaguirre, DNP student at the [employer].    Primary Physician: No Ref-Primary, Physician     History Of Present Illness:  Mr. Mccloud is a very pleasant, healthy 33 year old male who presents with family history of breast cancer and a personal diagnosis of a BRCA1 mutation.     Genetic Testin2019 --  POSITIVE for a BRCA1 mutation. This mutation is called p.* identified CustomNext-Cancer testing (a combination of OvaNext and PancNext) from [employer]. This testing was done because of Osmel's family history of a BRCA1 gene mutation, breast, ovarian and pancreatic cancer. This mutation has previously been identified in Osmel's sister and paternal relatives.      Of note, Osmel tested negative for mutations in the following genes by sequencing and deletion/duplication analysis:  APC, MATTHEW, BARD1, BRCA2, BRIP1, CDH1, CDKN2A, CHEK2, DICER1, MLH1, MRE11A, MSH2, MSH6, MUTYH, NBN, NF1, PALB2, PMS2, PTEN, RAD50, RAD51C, RAD51D, SMARCA4, STK11 and TP53 (sequencing and deletion/duplication); EPCAM (deletion/duplication only).      Pertinent screening history:  None to date.    Past Medical/Surgical History:  Past Medical History:   Diagnosis Date     BRCA1 gene mutation positive 2019     POSITIVE for a BRCA1 mutation. This mutation is called p.* ; elevated risk for male breast cancer and prostate cancer     No past surgical history on file.    Allergies:  Allergies as of 2019 - Reviewed 2019   Allergen Reaction Noted     Amoxicillin-pot clavulanate Rash, Hives, and Unknown 2012     Citalopram Anxiety 2015     Escitalopram Anxiety 2015     Amoxicillin       Clavulanic acid       Penicillins         Current Medications:  Current Outpatient Medications   Medication Sig Dispense Refill     ALPRAZolam (XANAX) 0.5 MG tablet TK 1 T PO BEFORE A FLIGHT UTD  0     cyclobenzaprine (FLEXERIL) 10 MG tablet TK 1 T PO TID FOR 10 DAYS PRN  0     lidocaine HCl 3 % cream NAA EXT AA TID FOR 10 DAYS PRN  0        Family History:  Family History   Problem Relation Age of Onset     Hereditary Breast and Ovarian Cancer Syndrome Sister         BRCA1+; no cancer, same genetic mutation as patient     Pancreatic Cancer Maternal Grandfather 77         at 78     Breast Cancer Other         paternal great grandmother     Ovarian Cancer Other         paternal great grandmother     Breast Cancer Paternal Aunt 33     Hereditary Breast and Ovarian Cancer Syndrome Paternal Aunt         BRCA1+     Breast Cancer Cousin 36        paternal cousin     Hereditary Breast and Ovarian Cancer Syndrome Cousin         BRCA1+       Social History:  Social History     Socioeconomic History     Marital status: Single     Spouse name: Not on file     Number of children: 0     Years of education: Not on  file     Highest education level: Not on file   Occupational History     Employer: [employer]   Social Needs     Financial resource strain: Not on file     Food insecurity:     Worry: Not on file     Inability: Not on file     Transportation needs:     Medical: Not on file     Non-medical: Not on file   Tobacco Use     Smoking status: Never Smoker     Smokeless tobacco: Never Used   Substance and Sexual Activity     Alcohol use: Not on file     Drug use: Not on file     Sexual activity: Not on file   Lifestyle     Physical activity:     Days per week: Not on file     Minutes per session: Not on file     Stress: Not on file   Relationships     Social connections:     Talks on phone: Not on file     Gets together: Not on file     Attends [employer] service: Not on file     Active member of club or organization: Not on file     Attends meetings of clubs or organizations: Not on file     Relationship status: Not on file     Intimate partner violence:     Fear of current or ex partner: Not on file     Emotionally abused: Not on file     Physically abused: Not on file     Forced sexual activity: Not on file   Other Topics Concern     Not on file   Social History Narrative     Not on file       Review of Systems:  GENERAL: No change in weight, sleep or appetite.  Normal energy.  No fever or chills  EYES: Negative for vision changes or eye problems  ENT: No problems with ears, nose or throat.  No difficulty swallowing.  RESP: No coughing, wheezing or shortness of breath  CV: No chest pains or palpitations  GI: No nausea, vomiting,  heartburn, abdominal pain, diarrhea, constipation or change in bowel habits; reports anal fissure identified on recent colonoscopy (no records available)  : No urinary frequency or dysuria, bladder or kidney problems  MUSCULOSKELETAL: No significant muscle or joint pains  NEUROLOGIC: No headaches, numbness, tingling, weakness, problems with balance or coordination  PSYCHIATRIC: No  "problems with anxiety, depression or mental health  HEME/IMMUNE/ALLERGY: No history of bleeding or clotting problems or anemia.  No allergies or immune system problems  ENDOCRINE: No history of thyroid disease, diabetes or other endocrine disorders  SKIN: No rashes,worrisome lesions or skin problems    Physical Exam:  /74   Pulse 71   Temp 97.6  F (36.4  C)   Resp 18   Ht 1.778 m (5' 10\")   Wt 100.2 kg (221 lb)   SpO2 97%   BMI 31.71 kg/m       GENERAL APPEARANCE: healthy, alert and no distress     NECK: no adenopathy, no asymmetry or masses     RESP: lungs clear to auscultation - no rales, rhonchi or wheezes    BREAST: A multipositional, bilateral breast exam was performed.  Symmetrical, very minimal breast tissue. Right breast: no palpable dominant masses, no nipple discharge, no skin changes. Dense tissue.  Right axilla: no palpable adenopathy. Left breast: no palpable dominant masses, no nipple discharge, no skin changes. Left axilla: no palpable adenopathy. Dense tissue.  LYMPHATICS: No cervical, supraclavicular, axillary or inguinal lymphadenopathy       SKIN: no suspicious lesions or rashes on anterior or posterior torso, upper extremities or face.    Laboratory/Imaging Studies  Results for orders placed or performed in visit on 01/25/19   [facility] Laboratory Miscellaneous Order   Result Value Ref Range    Miscellaneous Test         Specimen Received, Reordered and sent to Performing laboratory - Report to follow upon   completion.     Send outs misc test   Result Value Ref Range    Lab Scanned Result SEND OUTS MISC TEST-Scanned (A)        ASSESSMENT  We discussed the differences between cancer risk for the general population and those with BRCA mutations. Women with BRCA mutations have a 40-80% lifetime risk of breast cancer, compared to the general population risk of 12%. Those with a BRCA mutation also bear a 10-40% lifetime risk of ovarian cancer, compared to the " 1-2% lifetime risk within the general population.    We also discussed that inheriting a mutation does not mean that a person will develop cancer, but rather that they are at increased risk.       Osmel has an understanding that the BRCA1 gene, located on chromosome 17, is involved in both DNA repair and  the regulation of cell cycle checkpoints in response to DNA damage. The overall presence of genetic mutations in BRCA1 is estimated to be between 1:300 to 1:800 people.      Female BRCA1+ carriers have between a 46-87% lifetime risk for breast cancer and if diagnosed, are found to have a 21.1% risk for a second primary breast cancer within 10 years and 83% risk for second primary cancer to age 70.  The risk for ovarian cancer is estimated to be between 39-63%, as compared to a 1-2% risk for the general population. Risk for male breast cancer for BRCA1+ carriers is estimated to be 1.2% as compared to a 0.1% risk for men in the general population.  The risk for prostate cancer for male BRCA1+ carriers is estimated to be 8.6% by age 65, as compared to 6% for men in the general population by age 69. The risk for pancreatic cancer is estimated to be between 1-3%, as compared with the general population risk of 0.50%.  (all data from Nicolai et all 2016, Gene Reviews).    We also discussed following  a healthy lifestyle plan recommended by both NCCN and the American Cancer Society that can reduce the risk of  cancer:  1 Limit alcohol consumption to less than 1 drink per day (1 drink=5 oz.wine, 12 oz. Beer or 1.5 oz. 80-proof liquor). He is well within these guidelines.    2. Exercise per American Cancer Society guidelines of at least 150 minutes of moderate-intensity activity or 75 minutes of vigorous activity each week. (Or a combination of both.) Exercise should be spread  out over the week. 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 But states he hurt his  back recently and has been on light duty for several weeks.    3. Maintain a healthy weight with a Body Mass Index between 19-24.9. His BMI is 31.78. We discussed bringing his weight down closer to achieve a BMI of 25.  4. Do not use tobacco products and limit exposure to passive smoke. He does not smoke.     Individualized Surveillance Plan  BRCA1 and BRCA2 Management for Men   Per NCCN Guidelines Version 1.2019   Test or procedure Last done Next Scheduled    Breast self exam training and education, starting at age 35.      Clinical breast exam, every 12 months starting at age 35.     Prostate cancer screening recommended for BRCA2 carriers starting at age 45.    Consider prostate cancer screening for BRCA1 carriers, starting at age 45.     No specific guidelines for pancreatic cancer and melanoma exist, but screening may be individualized based on cancers observed in the family.     Consider full body skin and eye exam for melanoma and investigational protocols for pancreatic cancer screening.     The International Cancer of the Pancreas Screening (CAPS) Consortium recommends that individuals with a BRCA2 mutation who have at least one first-degree relative with pancreatic cancer should undergo initial screening with endoscopic ultrasonography and/or MRI/magnetic resonance cholangiopancreatography     He is doing well today and has no immediate concerns. We discussed prostate health and that he has no prostate cancer in his family but could consider screening around age 45.  Guidelines may be different by then for BRCA1+ carriers, so he is welcome to check back with me in the future with any questions.    We also discussed his heightened risk for melanoma and the ABCDs of identifying melanoma.  He expressed understanding.  We reviewed the screening plan below and he can follow up either with me or his primary care provider.                                                          I spent 38 minutes with the  patient with greater that 50% of it in counseling and coordinating care as documented above.    DEMETRIS Desouza-CNS, OCN, AGN-BC  Clinical Nurse Specialist  Cancer Risk Management Program  [address]  [place] [address]    phone:  [phone]  Pager: [phone]  fax: [phone]      CC: ROMEO Bermudez